# Patient Record
Sex: FEMALE | Race: AMERICAN INDIAN OR ALASKA NATIVE
[De-identification: names, ages, dates, MRNs, and addresses within clinical notes are randomized per-mention and may not be internally consistent; named-entity substitution may affect disease eponyms.]

---

## 2019-11-04 ENCOUNTER — HOSPITAL ENCOUNTER (EMERGENCY)
Dept: HOSPITAL 5 - ED | Age: 39
Discharge: HOME | End: 2019-11-04
Payer: MEDICAID

## 2019-11-04 VITALS — DIASTOLIC BLOOD PRESSURE: 94 MMHG | SYSTOLIC BLOOD PRESSURE: 136 MMHG

## 2019-11-04 DIAGNOSIS — J06.9: Primary | ICD-10-CM

## 2019-11-04 DIAGNOSIS — Z79.899: ICD-10-CM

## 2019-11-04 PROCEDURE — 96372 THER/PROPH/DIAG INJ SC/IM: CPT

## 2019-11-04 PROCEDURE — 99282 EMERGENCY DEPT VISIT SF MDM: CPT

## 2019-11-04 NOTE — EMERGENCY DEPARTMENT REPORT
Minor Respiratory





- HPI


Chief Complaint: Eye Problems


Stated Complaint: POSSIBLE BI EYE INFECTION/PAIN


Time Seen by Provider: 11/04/19 09:53


Duration: 3 Days


Severity: mild


Minor Respiratory: Yes Able to Tolerate Fluids, Yes Cough, No Rhinorrhea, No 

Sore Throat, No Ear Pain, No Sick Contacts, No Hemoptysis, No Chest Pain, No 

Shortness of Breath, No Fever


Other History: 40 yo comes to ER with cough and congestion for a few days. she 

also endorses eye irritation and redness with drainage at night. no fever. 

states she missed work. no hx asthma. no chills. no abd or back pain. no sob or 

cp.  ambulatory and non ill appearing in er. rx none.  no one in home with same.

 did not see pcp.





ED Review of Systems


ROS: 


Stated complaint: POSSIBLE BI EYE INFECTION/PAIN


Other details as noted in HPI





Comment: All other systems reviewed and negative





ED Past Medical Hx





- Past Medical History


Previous Medical History?: No





- Surgical History


Past Surgical History?: No





- Family History


Family history: no significant





- Social History


Smoking Status: Never Smoker


Substance Use Type: None





- Medications


Home Medications: 


                                Home Medications











 Medication  Instructions  Recorded  Confirmed  Last Taken  Type


 


Cetirizine HCl [ZyrTEC] 10 mg PO DAILY #30 capsule 11/04/19  Unknown Rx


 


Fluticasone [Flonase] 1 spray NS QDAY #1 bottle 11/04/19  Unknown Rx


 


Ibuprofen [Motrin] 800 mg PO Q8HR PRN #30 tablet 11/04/19  Unknown Rx


 


Polymyxin B Sulf/Trimethoprim 10 ml OP Q4H #1 drops 11/04/19  Unknown Rx





[Polytrim Eye Drops]     


 


hydrOXYzine PAMOATE [Vistaril] 25 mg PO Q6HR PRN #20 capsule 11/04/19  Unknown 

Rx


 


predniSONE [Deltasone] 20 mg PO DAILY #5 tablet 11/04/19  Unknown Rx














Minor Respiratory Exam





- Exam


General: 


Vital signs noted. No distress. Alert and acting appropriately.





HEENT: Yes Pharyngeal Erythema, Yes Moist Mucous Membranes, Yes Frontal 

Tenderness, Yes Maxillary Tenderness, No Pharyngeal Exudates, No Rhinorrhea, No 

Conjuctival Injection (mild b eye redness; no matting on exam)


Ear: Neither TM Bulge, Neither TM Erythema, Neither EAC Pain, Neither EAC 

Discharge


Neck: Yes Adenopathy, Yes Supple


Lungs: Yes Good Air Exchange, No Wheezes, No Ronchi, No Stridor, No Cough, No 

Labored Respirations, No Retractions, No Use of Accessory Muscles, No Other 

Abnormal Lung Sounds


Heart: Yes Regular, No Murmur


Abdomen: Yes Normal Bowel Sounds, No Tenderness, No Peritoneal Signs


Skin: No Rash, No Edema


Neurologic: 


Alert and oriented, no deficits.








Musculoskeletal: 


Unremarkable.











ED Course


                                   Vital Signs











  11/04/19





  09:43


 


Temperature 97.4 F L


 


Pulse Rate 100 H


 


Respiratory 16





Rate 


 


Blood Pressure 136/94


 


O2 Sat by Pulse 100





Oximetry 














ED Medical Decision Making





- Medical Decision Making





likely viral 





VS normal





discussed this with pt - dc home with dc plan of care and pcp follow up. she 

verbalizes understanding of plan of care. 











- Differential Diagnosis


simple uri


Critical care attestation.: 


If time is entered above; I have spent that time in minutes in the direct care 

of this critically ill patient, excluding procedure time.








ED Disposition


Clinical Impression: 


 URTI (acute upper respiratory infection), Bronchitis, Conjunctivitis





Disposition: DC-01 TO HOME OR SELFCARE


Is pt being admited?: No


Does the pt Need Aspirin: No


Condition: Stable


Instructions:  Upper Respiratory Infection (ED), Chronic Bronchitis (ED)


Additional Instructions: 


CONTINUE DOXY PILLS





DO NOT USE TOBRA DROPS





MEDS AS ORDERED TODAY





FOLLOW UP WITH PCP


REFERRAL BELOW





Prescriptions: 


predniSONE [Deltasone] 20 mg PO DAILY #5 tablet


Fluticasone [Flonase] 1 spray NS QDAY #1 bottle


Ibuprofen [Motrin] 800 mg PO Q8HR PRN #30 tablet


 PRN Reason: Pain, Mild (1-3)


Polymyxin B Sulf/Trimethoprim [Polytrim Eye Drops] 10 ml OP Q4H #1 drops


hydrOXYzine PAMOATE [Vistaril] 25 mg PO Q6HR PRN #20 capsule


 PRN Reason: Itching


Cetirizine HCl [ZyrTEC] 10 mg PO DAILY #30 capsule


Referrals: 


[facility] [Outside] - 3-5 Days


Forms:  Accompanied Note, Work/School Release Form(ED)


Time of Disposition: 09:58